# Patient Record
Sex: FEMALE | Race: ASIAN | NOT HISPANIC OR LATINO
[De-identification: names, ages, dates, MRNs, and addresses within clinical notes are randomized per-mention and may not be internally consistent; named-entity substitution may affect disease eponyms.]

---

## 2017-02-11 ENCOUNTER — APPOINTMENT (OUTPATIENT)
Dept: CARDIOLOGY | Facility: CLINIC | Age: 67
End: 2017-02-11

## 2017-02-11 VITALS
RESPIRATION RATE: 18 BRPM | OXYGEN SATURATION: 97 % | DIASTOLIC BLOOD PRESSURE: 83 MMHG | BODY MASS INDEX: 27.6 KG/M2 | SYSTOLIC BLOOD PRESSURE: 157 MMHG | HEART RATE: 62 BPM | HEIGHT: 62 IN | WEIGHT: 150 LBS | TEMPERATURE: 97.5 F

## 2017-02-11 DIAGNOSIS — R07.89 OTHER CHEST PAIN: ICD-10-CM

## 2017-02-11 DIAGNOSIS — I10 ESSENTIAL (PRIMARY) HYPERTENSION: ICD-10-CM

## 2017-02-11 RX ORDER — HYDROCHLOROTHIAZIDE 12.5 MG/1
12.5 TABLET ORAL
Qty: 30 | Refills: 5 | Status: ACTIVE | COMMUNITY
Start: 2017-02-11 | End: 1900-01-01

## 2017-03-14 PROBLEM — R07.89 CHEST DISCOMFORT: Status: ACTIVE | Noted: 2017-03-14

## 2020-11-17 ENCOUNTER — EMERGENCY (EMERGENCY)
Facility: HOSPITAL | Age: 70
LOS: 1 days | Discharge: HOME | End: 2020-11-17
Attending: EMERGENCY MEDICINE
Payer: MEDICARE

## 2020-11-17 VITALS
DIASTOLIC BLOOD PRESSURE: 92 MMHG | WEIGHT: 141.98 LBS | SYSTOLIC BLOOD PRESSURE: 183 MMHG | OXYGEN SATURATION: 97 % | HEIGHT: 62 IN | TEMPERATURE: 98 F | HEART RATE: 60 BPM | RESPIRATION RATE: 16 BRPM

## 2020-11-17 PROCEDURE — 99283 EMERGENCY DEPT VISIT LOW MDM: CPT | Mod: 25

## 2020-11-17 PROCEDURE — 12002 RPR S/N/AX/GEN/TRNK2.6-7.5CM: CPT

## 2020-11-18 VITALS
SYSTOLIC BLOOD PRESSURE: 136 MMHG | HEART RATE: 52 BPM | RESPIRATION RATE: 16 BRPM | OXYGEN SATURATION: 99 % | TEMPERATURE: 98 F | DIASTOLIC BLOOD PRESSURE: 73 MMHG

## 2020-11-18 PROCEDURE — 90471 IMMUNIZATION ADMIN: CPT

## 2020-11-18 PROCEDURE — 99283 EMERGENCY DEPT VISIT LOW MDM: CPT | Mod: 25

## 2020-11-18 PROCEDURE — 12002 RPR S/N/AX/GEN/TRNK2.6-7.5CM: CPT | Mod: F1,F2

## 2020-11-18 PROCEDURE — 90715 TDAP VACCINE 7 YRS/> IM: CPT

## 2020-11-18 PROCEDURE — 73140 X-RAY EXAM OF FINGER(S): CPT | Mod: 26,LT

## 2020-11-18 PROCEDURE — 73140 X-RAY EXAM OF FINGER(S): CPT

## 2020-11-18 RX ORDER — TETANUS TOXOID, REDUCED DIPHTHERIA TOXOID AND ACELLULAR PERTUSSIS VACCINE, ADSORBED 5; 2.5; 8; 8; 2.5 [IU]/.5ML; [IU]/.5ML; UG/.5ML; UG/.5ML; UG/.5ML
0.5 SUSPENSION INTRAMUSCULAR ONCE
Refills: 0 | Status: COMPLETED | OUTPATIENT
Start: 2020-11-18 | End: 2020-11-18

## 2020-11-18 RX ORDER — ACETAMINOPHEN 500 MG
975 TABLET ORAL ONCE
Refills: 0 | Status: COMPLETED | OUTPATIENT
Start: 2020-11-18 | End: 2020-11-18

## 2020-11-18 RX ADMIN — Medication 975 MILLIGRAM(S): at 01:01

## 2020-11-18 RX ADMIN — TETANUS TOXOID, REDUCED DIPHTHERIA TOXOID AND ACELLULAR PERTUSSIS VACCINE, ADSORBED 0.5 MILLILITER(S): 5; 2.5; 8; 8; 2.5 SUSPENSION INTRAMUSCULAR at 00:57

## 2020-11-18 NOTE — ED PROVIDER NOTE - NS ED ROS FT
Gen: Denies fever, chills  CV: Denies chest pain  Skin: laceration of LT fingers  Resp: Denies SOB, cough  GI: Denies nausea, vomiting, diarrhea

## 2020-11-18 NOTE — ED PROVIDER NOTE - PATIENT PORTAL LINK FT
You can access the FollowMyHealth Patient Portal offered by [facility] by registering at the following website: [email or web address]. By joining [facility]’s FollowMyHealth portal, you will also be able to view your health information using other applications (apps) compatible with our system.

## 2020-11-18 NOTE — ED PROVIDER NOTE - PHYSICAL EXAMINATION
Gen: well developed and well nourished, NAD  CV: RRR, +S1/S2, no M/R/G  Resp: CTAB, symmetric breath sounds, no W/R/R  GI: abdomen soft non-distended, NTTP  Skin: +2cm linear/horizontal laceration at dorsal/distal aspect of LT 2nd and 3rd digits. Able to range all three joints of the digits actively. 5/5 Strength intact of all digits, sensation intact.  Neuro: A&Ox3, following commands, speech clear, moving all four extremities spontaneously  Psych: appropriate mood, normal insight Gen: well developed and well nourished, NAD  CV: RRR, +S1/S2, no M/R/G  Resp: CTAB, symmetric breath sounds, no W/R/R  GI: abdomen soft non-distended, NTTP  Skin: +3cm linear/horizontal laceration at dorsal/distal aspect of LT 2nd and 3rd digits. Able to range all three joints of the digits actively. 5/5 Strength intact of all digits, sensation intact.  Neuro: A&Ox3, following commands, speech clear, moving all four extremities spontaneously  Psych: appropriate mood, normal insight

## 2020-11-18 NOTE — ED ADULT NURSE NOTE - OBJECTIVE STATEMENT
69 year old female presenting to ED c/o lacerations. Pt A+Ox3, moves all four extremities, c/o left hand second and third digit lacerations. Pt reports she accidentally cut herself when she was removing glass from a tube around 2130. Pt reports she felt dizzy and was bleeding excessively; elevated her hand and wrapped lacerations. In ED, bleeding stopped and pt reports slight dizziness. Pt denies taking pain medication, and denies headache, change in vision, chest pain, SOB, N/V, abdominal pain.

## 2020-11-18 NOTE — ED PROVIDER NOTE - OBJECTIVE STATEMENT
70 yo F with pmhx of HTN presents with laceration LT 2nd and 3rd digits. States she cut herself while she was taking out glass from a tube at 930p (3hrs ago). Unk last tetanus. Rt handed. Denies fever, chills, nausea, vomiting, diarrhea, chest pain, shortness of breath, cough.

## 2020-11-18 NOTE — ED PROVIDER NOTE - NSFOLLOWUPCLINICS_GEN_ALL_ED_FT
[facility]  Plastic Surgery  [address]  [place] [address]  Phone: [phone]  Fax:   Follow Up Time:

## 2020-11-18 NOTE — ED PROVIDER NOTE - NSFOLLOWUPINSTRUCTIONS_ED_ALL_ED_FT
You were seen for laceration repair. You have total of 8 sutures placed. Please follow up with your primary care physician or return to emergency room to have the sutures removed in 7 days. See the attached instructions. If you start developing fever, chills, discharge from the wounds, increased pain in the hand, redness/swelling, please seek medical help immediately. Please follow up with hand surgeon if needed as instructed.       Take all medication as directed.  For pain or fever you can ibuprofen (motrin, advil) or tylenol as needed, as directed on packaging.   Follow up with your primary care doctor within 5 days as directed.  If you had labs or imaging done, you were given copies of all of the availble results. If anything is pending to result or pending official read, you will receive a call if results are positive.  If needed, call patient access services at [phone] to find a primary care doctor, or call at [phone] to make an appointment at the clinic.  Return to the ER for any worsening symptoms or concerns, including chest pain, shortness of breath, fever, chills.

## 2020-11-18 NOTE — ED ADULT NURSE NOTE - NSIMPLEMENTINTERV_GEN_ALL_ED
Implemented All Universal Safety Interventions:  [place] to call system. Call bell, personal items and telephone within reach. Instruct patient to call for assistance. Room bathroom lighting operational. Non-slip footwear when patient is off stretcher. Physically safe environment: no spills, clutter or unnecessary equipment. Stretcher in lowest position, wheels locked, appropriate side rails in place.

## 2020-11-18 NOTE — ED PROCEDURE NOTE - ATTENDING CONTRIBUTION TO CARE
Dr. Grace (Attending Physician)  I supervised the above procedure and agree with the documented note.

## 2020-11-18 NOTE — ED PROVIDER NOTE - ATTENDING CONTRIBUTION TO CARE
Dr. Grace (Attending Physician)  I performed a history and physical exam of the patient and discussed their management with the resident. I reviewed the resident's note and agree with the documented findings and plan of care. My medical decision making and observations are found above.

## 2020-11-18 NOTE — ED PROVIDER NOTE - CLINICAL SUMMARY MEDICAL DECISION MAKING FREE TEXT BOX
RT handed. Comes in with laceration of LT 2nd and 3rd digits. Did not violate any joint space. Able to actively range all of the joints, strength and sensation intact. No obvious concern for neuro/tendon injury. Pain mgmt. Xrays. TDAP. Lac repair. Dr. Grace (Attending Physician)  RT handed. Comes in with laceration of LT 2nd and 3rd digits. Did not violate any joint space. Able to actively range all of the joints, strength and sensation intact. No obvious concern for neuro/tendon injury. Pain mgmt. Xrays. TDAP. Lac repair.

## 2022-02-18 ENCOUNTER — APPOINTMENT (OUTPATIENT)
Dept: CARDIOLOGY | Facility: CLINIC | Age: 72
End: 2022-02-18